# Patient Record
Sex: MALE | Race: OTHER | HISPANIC OR LATINO | Employment: OTHER | URBAN - METROPOLITAN AREA
[De-identification: names, ages, dates, MRNs, and addresses within clinical notes are randomized per-mention and may not be internally consistent; named-entity substitution may affect disease eponyms.]

---

## 2017-06-24 RX ORDER — CLOTRIMAZOLE 1 %
CREAM (GRAM) TOPICAL
Qty: 30 G | Refills: 0 | Status: SHIPPED | OUTPATIENT
Start: 2017-06-24 | End: 2019-04-22

## 2018-01-23 DIAGNOSIS — E11.65 TYPE 2 DIABETES MELLITUS WITH HYPERGLYCEMIA, WITHOUT LONG-TERM CURRENT USE OF INSULIN (HCC): ICD-10-CM

## 2018-01-23 DIAGNOSIS — B35.3 TINEA PEDIS OF BOTH FEET: ICD-10-CM

## 2018-01-23 RX ORDER — CLOTRIMAZOLE 1 %
CREAM (GRAM) TOPICAL
Qty: 30 G | Refills: 0 | Status: SHIPPED | OUTPATIENT
Start: 2018-01-23 | End: 2018-02-06 | Stop reason: SDUPTHER

## 2018-01-23 RX ORDER — GLIPIZIDE AND METFORMIN HCL 2.5; 5 MG/1; MG/1
TABLET, FILM COATED ORAL
Qty: 60 TAB | Refills: 0 | Status: SHIPPED | OUTPATIENT
Start: 2018-01-23 | End: 2018-02-06 | Stop reason: SDUPTHER

## 2018-02-06 ENCOUNTER — OFFICE VISIT (OUTPATIENT)
Dept: MEDICAL GROUP | Facility: CLINIC | Age: 73
End: 2018-02-06
Payer: MEDICARE

## 2018-02-06 VITALS
RESPIRATION RATE: 14 BRPM | BODY MASS INDEX: 24.59 KG/M2 | TEMPERATURE: 97.1 F | OXYGEN SATURATION: 97 % | HEIGHT: 64 IN | SYSTOLIC BLOOD PRESSURE: 138 MMHG | HEART RATE: 79 BPM | DIASTOLIC BLOOD PRESSURE: 78 MMHG | WEIGHT: 144 LBS

## 2018-02-06 DIAGNOSIS — B35.3 TINEA PEDIS OF BOTH FEET: ICD-10-CM

## 2018-02-06 DIAGNOSIS — Z12.11 COLON CANCER SCREENING: ICD-10-CM

## 2018-02-06 DIAGNOSIS — Z28.21 INFLUENZA VACCINATION DECLINED: ICD-10-CM

## 2018-02-06 DIAGNOSIS — E11.65 TYPE 2 DIABETES MELLITUS WITH HYPERGLYCEMIA, WITHOUT LONG-TERM CURRENT USE OF INSULIN (HCC): ICD-10-CM

## 2018-02-06 DIAGNOSIS — Z12.5 PROSTATE CANCER SCREENING: ICD-10-CM

## 2018-02-06 DIAGNOSIS — E78.5 HYPERLIPIDEMIA LDL GOAL <100: ICD-10-CM

## 2018-02-06 PROBLEM — F17.200 SMOKER: Status: ACTIVE | Noted: 2018-02-06

## 2018-02-06 PROCEDURE — 99214 OFFICE O/P EST MOD 30 MIN: CPT | Performed by: NURSE PRACTITIONER

## 2018-02-06 PROCEDURE — 92250 FUNDUS PHOTOGRAPHY W/I&R: CPT | Mod: TC | Performed by: NURSE PRACTITIONER

## 2018-02-06 RX ORDER — CLOTRIMAZOLE 1 %
CREAM (GRAM) TOPICAL
Qty: 30 G | Refills: 11 | Status: SHIPPED | OUTPATIENT
Start: 2018-02-06 | End: 2019-04-11 | Stop reason: SDUPTHER

## 2018-02-06 RX ORDER — LISINOPRIL 5 MG/1
5 TABLET ORAL DAILY
Qty: 90 TAB | Refills: 3 | Status: SHIPPED | OUTPATIENT
Start: 2018-02-06 | End: 2019-04-22 | Stop reason: SDUPTHER

## 2018-02-06 RX ORDER — GLIPIZIDE AND METFORMIN HCL 2.5; 5 MG/1; MG/1
TABLET, FILM COATED ORAL
Qty: 180 TAB | Refills: 0 | Status: SHIPPED | OUTPATIENT
Start: 2018-02-06 | End: 2018-07-10 | Stop reason: SDUPTHER

## 2018-02-06 RX ORDER — SIMVASTATIN 20 MG
20 TABLET ORAL EVERY EVENING
Qty: 90 TAB | Refills: 3 | Status: SHIPPED | OUTPATIENT
Start: 2018-02-06 | End: 2019-04-11 | Stop reason: SDUPTHER

## 2018-02-06 ASSESSMENT — PATIENT HEALTH QUESTIONNAIRE - PHQ9: CLINICAL INTERPRETATION OF PHQ2 SCORE: 0

## 2018-02-06 NOTE — PROGRESS NOTES
CC: Follow-Up (DM and medication refill)        HPI:     Eddy presents today for the followin. Type 2 diabetes mellitus with hyperglycemia, without long-term current use of insulin (CMS-[facility])  Currently out of his medication for approximately 2 weeks. Does not check his blood sugars at home. Denies any polys or feeling unusual without his diabetes medication. Last eye exam over a year ago.    2. Hyperlipidemia LDL goal <100  Has been out of his simvastatin for approximately 2 weeks. No reports of adverse effects when he took it. No complete the chest pains or history of CAD.      Current Outpatient Prescriptions   Medication Sig Dispense Refill   • glipizide-metformin (METAGLIP) 2.5-500 MG per tablet TAKE ONE TABLET BY MOUTH TWICE DAILY ,  BEFORE  BREAKFAST  AND  BEFORE  DINNER. 180 Tab 0   • lisinopril (PRINIVIL) 5 MG Tab Take 1 Tab by mouth every day. 90 Tab 3   • simvastatin (ZOCOR) 20 MG Tab Take 1 Tab by mouth every evening. 90 Tab 3   • clotrimazole (LOTRIMIN) 1 % Cream APPLY TOPICALLY TO FEET TWICE DAILY AS NEEDED 30 g 11   • clotrimazole (LOTRIMIN) 1 % Cream APPLY TOPICALLY TO FEET TWICE DAILY AS NEEDED 30 g 0   • Blood Glucose Monitoring Suppl SUPPLIES Misc Test strips order: Test strips for Meter per insurance preference. Sig: use TID and prn ssx high or low sugar #100 RF x 3 100 Each 11   • Lancets Misc Lancets order: Lancets for Meter per insurance preference. Sig: use TID and prn ssx high or low sugar. #100 RF x 3 100 Each 11   • Blood Glucose Monitoring Suppl Device Meter: Dispense  Meter per insurance preference. Sig. Use as directed for blood sugar monitoring. #1. NR. 1 Device 0     No current facility-administered medications for this visit.      Social History   Substance Use Topics   • Smoking status: Current Some Day Smoker     Packs/day: 0.25     Years: 60.00     Types: Cigarettes   • Smokeless tobacco: Never Used      Comment: smokes 2-3 ciagarrets a day.   • Alcohol use No     I  "reviewed patients allergies, problem list and medications today in [facility].    ROS: Any/all pertinent positives listed in the HPI, otherwise all others reviewed are negative today.      /78   Pulse 79   Temp 36.2 °C (97.1 °F)   Resp 14   Ht 1.626 m (5' 4\")   Wt 65.3 kg (144 lb)   SpO2 97%   BMI 24.72 kg/m²     Exam:   Gen: Alert and oriented, No apparent distress. WDWN  Psych: A+Ox3, normal affect and mood  Skin: Warm, dry and intact. Good turgor   No rashes in visible areas.  Eye: Conjunctiva clear, lids normal  ENMT: Lips without lesions  Lungs: Clear to auscultation bilaterally, no rales or rhonchi   Unlabored respiratory effort.   CV: Regular rate and rhythm, S1, S2. No murmurs.   No Edema  Ext: No clubbing, cyanosis, edema.   Monofilament testing with a 10 gram force: sensation intact: intact bilaterally however decreased on different small localized areas of both feet based on exam.   Visual Inspection: Feet without maceration, ulcers, fissures.  Does have a small amount of tinea present on both feet   Pedal pulses: intact bilaterally we did review foot care with the patient and his daughter      Assessment and Plan.   72 y.o. male with the following issues.    1. Type 2 diabetes mellitus with hyperglycemia, without long-term current use of insulin (CMS-HCC)  Stable. He's apparently been out of medication for approximately 15 days. He will restart medications. Refill sent today. In around 2 months or before his 3 month follow-up he will go to the lab to recheck his cholesterol and sugar levels. Retinal exam here today.  - COMP METABOLIC PANEL; Future  - HEMOGLOBIN A1C; Future  - LIPID PROFILE; Future  - MICROALBUMIN CREAT RATIO URINE (LAB COLLECT); Future  - glipizide-metformin (METAGLIP) 2.5-500 MG per tablet; TAKE ONE TABLET BY MOUTH TWICE DAILY ,  BEFORE  BREAKFAST  AND  BEFORE  DINNER.  Dispense: 180 Tab; Refill: 0  - lisinopril (PRINIVIL) 5 MG Tab; Take 1 Tab by mouth every day.  Dispense: 90 " Tab; Refill: 3  - POCT Retinal Eye Exam  - Diabetic Monofilament LE Exam    2. Hyperlipidemia LDL goal <100  Stable. Continue medication, labs in 2-3 months  - LIPID PROFILE; Future  - simvastatin (ZOCOR) 20 MG Tab; Take 1 Tab by mouth every evening.  Dispense: 90 Tab; Refill: 3    3. Prostate cancer screening  Ordered routinely  - PROSTATE SPECIFIC AG SCREENING; Future    4. Tinea pedis of both feet  Stable refills of cream given to his pharmacy. We did discuss foot care  - clotrimazole (LOTRIMIN) 1 % Cream; APPLY TOPICALLY TO FEET TWICE DAILY AS NEEDED  Dispense: 30 g; Refill: 11    5. Colon cancer screening  Ordered routinely  - OCCULT BLOOD FECES IMMUNOASSAY; Future    6. Influenza vaccination declined  Declines this today

## 2018-02-20 ENCOUNTER — TELEPHONE (OUTPATIENT)
Dept: MEDICAL GROUP | Facility: CLINIC | Age: 73
End: 2018-02-20

## 2018-02-20 DIAGNOSIS — E11.3399 MODERATE NONPROLIFERATIVE DIABETIC RETINOPATHY ASSOCIATED WITH TYPE 2 DIABETES MELLITUS, MACULAR EDEMA PRESENCE UNSPECIFIED, UNSPECIFIED LATERALITY (HCC): ICD-10-CM

## 2018-02-21 NOTE — TELEPHONE ENCOUNTER
Korean speaker. Please contact the patient. His retinal/eye exam done in the office does show concern for diabetes affects in the eye. A further exam is recommended and I placed a referral to see an ophthalmologist for this

## 2018-05-04 ENCOUNTER — HOSPITAL ENCOUNTER (OUTPATIENT)
Facility: MEDICAL CENTER | Age: 73
End: 2018-05-04
Attending: NURSE PRACTITIONER
Payer: MEDICARE

## 2018-05-04 PROCEDURE — 82274 ASSAY TEST FOR BLOOD FECAL: CPT

## 2018-05-08 LAB
ALBUMIN SERPL-MCNC: 4.5 G/DL (ref 3.5–4.8)
ALBUMIN/GLOB SERPL: 1.9 {RATIO} (ref 1.2–2.2)
ALP SERPL-CCNC: 63 IU/L (ref 39–117)
ALT SERPL-CCNC: 18 IU/L (ref 0–44)
AST SERPL-CCNC: 18 IU/L (ref 0–40)
BILIRUB SERPL-MCNC: 0.5 MG/DL (ref 0–1.2)
BUN SERPL-MCNC: 12 MG/DL (ref 8–27)
BUN/CREAT SERPL: 14 (ref 10–24)
CALCIUM SERPL-MCNC: 9.8 MG/DL (ref 8.6–10.2)
CHLORIDE SERPL-SCNC: 101 MMOL/L (ref 96–106)
CHOLEST SERPL-MCNC: 148 MG/DL (ref 100–199)
CO2 SERPL-SCNC: 22 MMOL/L (ref 18–29)
CREAT SERPL-MCNC: 0.85 MG/DL (ref 0.76–1.27)
GFR SERPLBLD CREATININE-BSD FMLA CKD-EPI: 102 ML/MIN/1.73
GFR SERPLBLD CREATININE-BSD FMLA CKD-EPI: 88 ML/MIN/1.73
GLOBULIN SER CALC-MCNC: 2.4 G/DL (ref 1.5–4.5)
GLUCOSE SERPL-MCNC: 119 MG/DL (ref 65–99)
HBA1C MFR BLD: 6.8 % (ref 4.8–5.6)
HDLC SERPL-MCNC: 52 MG/DL
LABORATORY COMMENT REPORT: NORMAL
LDLC SERPL CALC-MCNC: 73 MG/DL (ref 0–99)
POTASSIUM SERPL-SCNC: 4.2 MMOL/L (ref 3.5–5.2)
PROT SERPL-MCNC: 6.9 G/DL (ref 6–8.5)
PSA SERPL-MCNC: 0.5 NG/ML (ref 0–4)
REQUEST PROBLEM   100875: NORMAL
SODIUM SERPL-SCNC: 141 MMOL/L (ref 134–144)
TRIGL SERPL-MCNC: 117 MG/DL (ref 0–149)
VLDLC SERPL CALC-MCNC: 23 MG/DL (ref 5–40)

## 2018-05-09 DIAGNOSIS — Z12.11 COLON CANCER SCREENING: ICD-10-CM

## 2018-05-10 LAB — HEMOCCULT STL QL IA: NEGATIVE

## 2018-07-10 DIAGNOSIS — E11.65 TYPE 2 DIABETES MELLITUS WITH HYPERGLYCEMIA, WITHOUT LONG-TERM CURRENT USE OF INSULIN (HCC): ICD-10-CM

## 2018-07-11 RX ORDER — GLIPIZIDE AND METFORMIN HCL 2.5; 5 MG/1; MG/1
TABLET, FILM COATED ORAL
Qty: 180 TAB | Refills: 0 | Status: SHIPPED | OUTPATIENT
Start: 2018-07-11 | End: 2018-10-17 | Stop reason: SDUPTHER

## 2018-07-11 NOTE — TELEPHONE ENCOUNTER
VOICEMAIL  1. Caller Name: Suzy                      Call Back Number: [phone] (home)     2. Message: Calling on behalf of her father, pt, who doesn't have any refills of metformin left.     3. Patient approves office to leave a detailed voicemail/MyChart message: N\A    4. Called back to notify that I will send request to Suzy. Per daughter, that was the only medication he needed & pharmacy verified.

## 2018-10-17 DIAGNOSIS — E11.65 TYPE 2 DIABETES MELLITUS WITH HYPERGLYCEMIA, WITHOUT LONG-TERM CURRENT USE OF INSULIN (HCC): ICD-10-CM

## 2018-10-17 RX ORDER — GLIPIZIDE AND METFORMIN HCL 2.5; 5 MG/1; MG/1
TABLET, FILM COATED ORAL
Qty: 180 TAB | Refills: 0 | Status: SHIPPED | OUTPATIENT
Start: 2018-10-17 | End: 2019-04-11 | Stop reason: SDUPTHER

## 2019-04-22 ENCOUNTER — OFFICE VISIT (OUTPATIENT)
Dept: MEDICAL GROUP | Facility: MEDICAL CENTER | Age: 74
End: 2019-04-22
Payer: MEDICARE

## 2019-04-22 VITALS
RESPIRATION RATE: 14 BRPM | BODY MASS INDEX: 24.07 KG/M2 | SYSTOLIC BLOOD PRESSURE: 178 MMHG | HEART RATE: 87 BPM | OXYGEN SATURATION: 95 % | HEIGHT: 64 IN | TEMPERATURE: 98.1 F | DIASTOLIC BLOOD PRESSURE: 82 MMHG | WEIGHT: 141 LBS

## 2019-04-22 DIAGNOSIS — R03.0 ELEVATED BP WITHOUT DIAGNOSIS OF HYPERTENSION: ICD-10-CM

## 2019-04-22 DIAGNOSIS — Z12.5 SCREENING FOR PROSTATE CANCER: ICD-10-CM

## 2019-04-22 DIAGNOSIS — E11.65 TYPE 2 DIABETES MELLITUS WITH HYPERGLYCEMIA, WITHOUT LONG-TERM CURRENT USE OF INSULIN (HCC): ICD-10-CM

## 2019-04-22 DIAGNOSIS — E11.3399 MODERATE NONPROLIFERATIVE DIABETIC RETINOPATHY ASSOCIATED WITH TYPE 2 DIABETES MELLITUS, MACULAR EDEMA PRESENCE UNSPECIFIED, UNSPECIFIED LATERALITY (HCC): ICD-10-CM

## 2019-04-22 DIAGNOSIS — F17.200 SMOKER: ICD-10-CM

## 2019-04-22 LAB
HBA1C MFR BLD: 6.4 % (ref 0–5.6)
INT CON NEG: NEGATIVE
INT CON POS: POSITIVE

## 2019-04-22 PROCEDURE — 99214 OFFICE O/P EST MOD 30 MIN: CPT | Performed by: NURSE PRACTITIONER

## 2019-04-22 PROCEDURE — 83036 HEMOGLOBIN GLYCOSYLATED A1C: CPT | Performed by: NURSE PRACTITIONER

## 2019-04-22 RX ORDER — LISINOPRIL 5 MG/1
5 TABLET ORAL DAILY
Qty: 90 TAB | Refills: 3 | Status: SHIPPED | OUTPATIENT
Start: 2019-04-22

## 2019-04-22 ASSESSMENT — PATIENT HEALTH QUESTIONNAIRE - PHQ9: CLINICAL INTERPRETATION OF PHQ2 SCORE: 0

## 2019-04-22 NOTE — PROGRESS NOTES
"CC: Diabetes        HPI:     Eddy presents today for the followin. Moderate nonproliferative diabetic retinopathy associated with type 2 diabetes mellitus, macular edema presence unspecified, unspecified laterality (HCC)/Type 2 diabetes mellitus with hyperglycemia, without long-term current use of insulin (HCC)  Compliant with his medication, has been taking it daily since he was last seen about 2 years ago.  Does not check his blood sugar at home.  No reports of polys.  Does have retinopathy and is followed by Dr. Vieyra and states that this is currently very stable    3. Elevated BP without diagnosis of hypertension  Stop lisinopril because \"did not want to take a lot of pills\".  Stopped this quite some time ago.  Historically does not have a history of high blood pressure    4. SSmoker  Smokes 2-3 cigarettes a day, not interested in quitting    Current Outpatient Prescriptions   Medication Sig Dispense Refill   • lisinopril (PRINIVIL) 5 MG Tab Take 1 Tab by mouth every day. 90 Tab 3   • glipizide-metformin (METAGLIP) 2.5-500 MG per tablet TAKE 1 TABLET BY MOUTH TWICE DAILY BEFORE BREAKFAST AND  BEFORE  DINNER 180 Tab 0   • simvastatin (ZOCOR) 20 MG Tab TAKE 1 TABLET BY MOUTH IN THE EVENING 90 Tab 3   • clotrimazole (LOTRIMIN) 1 % Cream APPLY TOPICALLY TO FEET TWICE DAILY AS NEEDED.  *NEEDS OFFICE VISIT FOR FURTHER REFILLS* 1 Tube 0     No current facility-administered medications for this visit.      Social History   Substance Use Topics   • Smoking status: Current Some Day Smoker     Packs/day: 0.25     Years: 60.00     Types: Cigarettes   • Smokeless tobacco: Never Used      Comment: smokes 2-3 ciagarrets a day.   • Alcohol use No     I reviewed patients allergies, problem list and medications today in EPIC.    ROS: Any/all pertinent positives listed in the HPI, otherwise all others reviewed are negative today.      BP (!) 178/82 (BP Location: Left arm, Patient Position: Sitting, BP Cuff Size: Adult) " "  Pulse 87   Temp 36.7 °C (98.1 °F) (Temporal)   Resp 14   Ht 1.626 m (5' 4\")   Wt 64 kg (141 lb)   SpO2 95%   BMI 24.20 kg/m²     Exam:    Gen: Alert and oriented, No apparent distress. WDWN  Psych: A+Ox3, normal affect and mood  Skin: Warm, dry and intact. Good turgor   No rashes in visible areas.  Eye: Conjunctiva clear, lids normal  ENMT: Lips without lesions, good dentition   Oropharynx clear.   Neck: No Lymphadenopathy, Thyromegaly, Bruits.   Trachea midline, no masses  Lungs: Clear to auscultation bilaterally, no rales or rhonchi   Unlabored respiratory effort.   CV: Regular rate and rhythm, S1, S2. No murmurs.   No Edema  Point-of-care A1c: 6.4  Manual blood pressure, left arm, sittin/75    Assessment and Plan.   73 y.o. male with the following issues.    1. Moderate nonproliferative diabetic retinopathy associated with type 2 diabetes mellitus, macular edema presence unspecified, unspecified laterality (HCC)Type 2 diabetes mellitus with hyperglycemia, without long-term current use of insulin (HCC)  Stable continues to be well controlled.  Continue medication.  Labs ordered as below.  He will restart lisinopril on her follow-up with me in the office in 2-3 weeks to review everything.  We will request his most recent eye exam from the last month from Dr. Vieyra.  Eyes are currently stable.  - POCT  A1C  - Comp Metabolic Panel; Future  - Lipid Profile; Future  - HEMOGLOBIN A1C; Future  - MICROALBUMIN CREAT RATIO URINE; Future  - lisinopril (PRINIVIL) 5 MG Tab; Take 1 Tab by mouth every day.  Dispense: 90 Tab; Refill: 3    3. Elevated BP without diagnosis of hypertension  Mildly improved at the end of the appointment, we will continue to monitor and we will restart the lisinopril    4. Screening for prostate cancer  Ordered  - PROSTATE SPECIFIC AG SCREENING; Future    5. Smoker  Encouraged to quit      "

## 2019-04-22 NOTE — LETTER
[facility]  RODERICK Ratliff.  [address]  [facility] [address]  Fax: [phone]   Authorization for Release/Disclosure of   Protected Health Information   Name: EDDY BILLS : 1945 SSN: [id number]   Address: [address] [place] [id number] Phone:    [phone] (home)    I authorize the entity listed below to release/disclose the PHI below to:   [facility]/RENZO Ratliff and RENZO Ratliff   Provider or Entity Name:  Dr Mullins   Address   City, [facility], [place], [place]  Phone:      Fax:     Reason for request: continuity of care   Information to be released:    [  ] LAST COLONOSCOPY,  including any PATH REPORT and follow-up  [  ] LAST FIT/COLOGUARD RESULT [  ] LAST DEXA  [  ] LAST MAMMOGRAM  [  ] LAST PAP  [  ] LAST LABS [X] RETINA EXAM REPORT  [  ] IMMUNIZATION RECORDS  [ X] Release all info      [  ] Check here and initial the line next to each item to release ALL health information INCLUDING  _____ Care and treatment for drug and / or alcohol abuse  _____ HIV testing, infection status, or AIDS  _____ Genetic Testing    DATES OF SERVICE OR TIME PERIOD TO BE DISCLOSED: _____________  I understand and acknowledge that:  * This Authorization may be revoked at any time by you in writing, except if your health information has already been used or disclosed.  * Your health information that will be used or disclosed as a result of you signing this authorization could be re-disclosed by the recipient. If this occurs, your re-disclosed health information may no longer be protected by State or Federal laws.  * You may refuse to sign this Authorization. Your refusal will not affect your ability to obtain treatment.  * This Authorization becomes effective upon signing and will  on (date) __________.      If no date is indicated, this Authorization will  one (1) year from the signature date.    Name: Eddy Bills    Signature:   Date:     4/22/2019       PLEASE FAX REQUESTED RECORDS BACK TO: [phone]

## 2019-08-30 DIAGNOSIS — B35.3 TINEA PEDIS OF BOTH FEET: ICD-10-CM

## 2019-09-01 ENCOUNTER — OFFICE VISIT (OUTPATIENT)
Dept: URGENT CARE | Facility: CLINIC | Age: 74
End: 2019-09-01
Payer: MEDICARE

## 2019-09-01 VITALS
HEIGHT: 64 IN | DIASTOLIC BLOOD PRESSURE: 88 MMHG | BODY MASS INDEX: 24.75 KG/M2 | HEART RATE: 80 BPM | WEIGHT: 145 LBS | OXYGEN SATURATION: 100 % | SYSTOLIC BLOOD PRESSURE: 146 MMHG | RESPIRATION RATE: 16 BRPM | TEMPERATURE: 97.8 F

## 2019-09-01 DIAGNOSIS — T21.22XA PARTIAL THICKNESS BURN OF ABDOMEN, INITIAL ENCOUNTER: ICD-10-CM

## 2019-09-01 PROCEDURE — 99214 OFFICE O/P EST MOD 30 MIN: CPT | Performed by: PHYSICIAN ASSISTANT

## 2019-09-01 ASSESSMENT — ENCOUNTER SYMPTOMS
ABDOMINAL PAIN: 1
PALPITATIONS: 0
SHORTNESS OF BREATH: 0
FEVER: 0
COUGH: 0
BURN: 1
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
## 2019-09-01 NOTE — PATIENT INSTRUCTIONS
Cuidado de las quemaduras  (Burn Care)  La piel es althea king natural que nos protege contra las infecciones. Es el órgano más tony de nuestro cuerpo. Marvin usted ha sufrido althea quemadura, esta protección natural está dañada. Para ayudarlo a prevenir la infección, es muy importante que siga estas instrucciones para el cuidado de la quemadura.  Quemaduras se clasifican:  · de primer hali - sólo eritema o enrojecimiento de la piel. No es de esperar que se produzcan cicatrices.  · de tanja hali - se ampolla la piel. En las quemaduras profundas puede quedar althea cicatriz.  · de tercer hali - destrucción de todas las capas de la piel y chase cicatrices.  INSTRUCCIONES PARA EL CUIDADO DOMICILIARIO  · Lávese roslyn las susy antes de cambiarse el vendaje.  · Cambie el vendaje con la frecuencia que se lo indique costa médico.  ¨ Retire los vendajes viejos. Si se adhieren a la piel, para retirarlos puede remojarlos con agua fría y limpia.  ¨ Limpie minuciosamente, clemente con cuidado con un jabón suave y agua.  ¨ Seque dando palmaditas con un paño limpio y seco.  ¨ Aplique althea capa delgada de crema con antibiótico para la quemadura.  ¨ Aplíquese vendajes limpios marvin le indicó el profesional que lo asiste.  ¨ Mantenga el vendaje tan limpio y seco marvin sea posible.  · Eleve la jose afectada cristina las primeras 24 horas, y luego según le haya indicado el profesional que lo asiste.  · Utilice los medicamentos de venta epi o de prescripción para el dolor, el malestar o la fiebre, según se lo indique el profesional que lo asiste.  SOLICITE ATENCIÓN MÉDICA DE INMEDIATO SI:  · El dolor es excesivo.  · En la jose quemada hay un aumento del enrojecimiento, sensibilidad, hinchazón o jolly dominique cerca de la quemadura.  · Aparece un líquido de color cano amarillento (pus) en jose quemada, o ésta tiene mal olor.  · Tiene fiebre.  ESTÉ SEGURO QUE:  · Comprende las instrucciones para el aranza médica.  · Controlará costa  enfermedad.  · Solicitará atención médica de inmediato según las indicaciones.     Esta información no tiene aurelio fin reemplazar el consejo del médico. Asegúrese de hacerle al médico cualquier pregunta que tenga.     Document Released: 12/18/2006 Document Revised: 03/11/2013  Elsevier Interactive Patient Education ©2016 [employer]

## 2019-09-02 RX ORDER — CLOTRIMAZOLE 1 %
CREAM (GRAM) TOPICAL
Qty: 1 TUBE | Refills: 0 | Status: SHIPPED | OUTPATIENT
Start: 2019-09-02

## 2021-01-15 DIAGNOSIS — Z23 NEED FOR VACCINATION: ICD-10-CM
